# Patient Record
Sex: FEMALE | Race: WHITE | NOT HISPANIC OR LATINO | Employment: UNEMPLOYED | URBAN - NONMETROPOLITAN AREA
[De-identification: names, ages, dates, MRNs, and addresses within clinical notes are randomized per-mention and may not be internally consistent; named-entity substitution may affect disease eponyms.]

---

## 2020-08-28 PROBLEM — R10.11 RIGHT UPPER QUADRANT PAIN: Status: ACTIVE | Noted: 2020-08-28

## 2020-08-30 PROBLEM — K81.9 ACALCULOUS CHOLECYSTITIS: Status: ACTIVE | Noted: 2020-08-30

## 2020-08-30 PROBLEM — K82.8 BILIARY DYSKINESIA: Status: ACTIVE | Noted: 2020-08-30

## 2022-06-05 ENCOUNTER — HOSPITAL ENCOUNTER (EMERGENCY)
Facility: HOSPITAL | Age: 37
Discharge: HOME | End: 2022-06-05
Payer: COMMERCIAL

## 2022-06-05 VITALS
RESPIRATION RATE: 18 BRPM | DIASTOLIC BLOOD PRESSURE: 81 MMHG | HEIGHT: 65 IN | SYSTOLIC BLOOD PRESSURE: 125 MMHG | WEIGHT: 214 LBS | HEART RATE: 76 BPM | BODY MASS INDEX: 35.65 KG/M2 | OXYGEN SATURATION: 99 % | TEMPERATURE: 98 F

## 2022-06-05 DIAGNOSIS — Z91.038 ALLERGIC REACTION TO INSECT BITE: Primary | ICD-10-CM

## 2022-06-05 PROCEDURE — 96361 HYDRATE IV INFUSION ADD-ON: CPT

## 2022-06-05 PROCEDURE — 99284 EMERGENCY DEPT VISIT MOD MDM: CPT | Mod: 25

## 2022-06-05 PROCEDURE — 63600175 PHARM REV CODE 636 W HCPCS: Performed by: REGISTERED NURSE

## 2022-06-05 PROCEDURE — 99283 PR EMERGENCY DEPT VISIT,LEVEL III: ICD-10-PCS | Mod: ,,, | Performed by: REGISTERED NURSE

## 2022-06-05 PROCEDURE — 99283 EMERGENCY DEPT VISIT LOW MDM: CPT | Mod: ,,, | Performed by: REGISTERED NURSE

## 2022-06-05 PROCEDURE — 96375 TX/PRO/DX INJ NEW DRUG ADDON: CPT

## 2022-06-05 PROCEDURE — 96374 THER/PROPH/DIAG INJ IV PUSH: CPT

## 2022-06-05 PROCEDURE — 25000003 PHARM REV CODE 250: Performed by: REGISTERED NURSE

## 2022-06-05 RX ORDER — DIPHENHYDRAMINE HYDROCHLORIDE 50 MG/ML
25 INJECTION INTRAMUSCULAR; INTRAVENOUS
Status: COMPLETED | OUTPATIENT
Start: 2022-06-05 | End: 2022-06-05

## 2022-06-05 RX ORDER — METHYLPREDNISOLONE SOD SUCC 125 MG
125 VIAL (EA) INJECTION
Status: COMPLETED | OUTPATIENT
Start: 2022-06-05 | End: 2022-06-05

## 2022-06-05 RX ORDER — FAMOTIDINE 10 MG/ML
20 INJECTION INTRAVENOUS
Status: COMPLETED | OUTPATIENT
Start: 2022-06-05 | End: 2022-06-05

## 2022-06-05 RX ORDER — EPINEPHRINE 0.3 MG/.3ML
2 INJECTION SUBCUTANEOUS
Qty: 2 EACH | Refills: 0 | Status: SHIPPED | OUTPATIENT
Start: 2022-06-05

## 2022-06-05 RX ADMIN — FAMOTIDINE 20 MG: 10 INJECTION INTRAVENOUS at 07:06

## 2022-06-05 RX ADMIN — SODIUM CHLORIDE 1000 ML: 9 INJECTION, SOLUTION INTRAVENOUS at 07:06

## 2022-06-05 RX ADMIN — DIPHENHYDRAMINE HYDROCHLORIDE 25 MG: 50 INJECTION, SOLUTION INTRAMUSCULAR; INTRAVENOUS at 07:06

## 2022-06-05 RX ADMIN — METHYLPREDNISOLONE SODIUM SUCCINATE 125 MG: 125 INJECTION, POWDER, FOR SOLUTION INTRAMUSCULAR; INTRAVENOUS at 07:06

## 2022-06-06 ENCOUNTER — TELEPHONE (OUTPATIENT)
Dept: EMERGENCY MEDICINE | Facility: HOSPITAL | Age: 37
End: 2022-06-06
Payer: COMMERCIAL

## 2022-06-06 NOTE — ED PROVIDER NOTES
Encounter Date: 6/5/2022       History     Chief Complaint   Patient presents with    Insect Bite     WASP     Portia Lane is a 37 yo WF that presents with c/o allergic reaction to wasp sting.  Reports an allergy to wasp venom.  Pt is shaking excessively but able to talk.  Skin and ears are red and periorbital edema is noted bilaterally.  Pt states her lips are tingling.      The history is provided by the patient and the spouse.     Review of patient's allergies indicates:   Allergen Reactions    Wasp venom      Past Medical History:   Diagnosis Date    Arthritis     Chronic back pain     Depression     GERD (gastroesophageal reflux disease)      Past Surgical History:   Procedure Laterality Date    ADENOIDECTOMY      APPENDECTOMY      CHOLECYSTECTOMY      HYSTERECTOMY      LAPAROSCOPIC CHOLECYSTECTOMY N/A 08/29/2020    Procedure: CHOLECYSTECTOMY, LAPAROSCOPIC;  Surgeon: Wagner Schroeder MD;  Location: [facility];  Service: General;  Laterality: N/A;    TONSILLECTOMY      TUBAL LIGATION       History reviewed. No pertinent family history.  Social History     Tobacco Use    Smoking status: Current Every Day Smoker    Smokeless tobacco: Never Used   Substance Use Topics    Alcohol use: Yes     Comment: OCCAISIONAL    Drug use: No     Review of Systems   Constitutional: Positive for chills.   HENT: Positive for facial swelling (periorbital). Negative for trouble swallowing and voice change.    Eyes: Positive for redness.   Respiratory: Positive for cough and shortness of breath. Negative for wheezing and stridor.    Gastrointestinal: Positive for abdominal pain.   Endocrine: Negative.    Genitourinary: Negative.    Musculoskeletal: Negative.    Skin: Positive for color change (erythematous).   Neurological: Positive for tremors and light-headedness.   Psychiatric/Behavioral: The patient is nervous/anxious.        Physical Exam     Initial Vitals [06/05/22 1942]   BP Pulse Resp Temp SpO2   108/82 107  (!) 24 97.7 °F (36.5 °C) 97 %      MAP       --         Physical Exam    Constitutional: She appears well-developed and well-nourished. She is not diaphoretic. She appears distressed (anxious).   HENT:   Head: Normocephalic and atraumatic.   Right Ear: External ear normal.   Left Ear: External ear normal.   Nose: Nose normal.   Mouth/Throat: Oropharynx is clear and moist. No oropharyngeal exudate.   Eyes: Conjunctivae and EOM are normal. Pupils are equal, round, and reactive to light.   Neck: Neck supple. No tracheal deviation present.   Normal range of motion.  Cardiovascular: Regular rhythm and normal heart sounds.   Pulmonary/Chest: Breath sounds normal. No stridor. No respiratory distress. She has no wheezes.   Abdominal: Abdomen is soft. Bowel sounds are normal.   Generalized abd pain that feels better with legs bent         Musculoskeletal:         General: Normal range of motion.      Cervical back: Normal range of motion and neck supple.     Lymphadenopathy:     She has no cervical adenopathy.   Neurological: She is alert and oriented to person, place, and time. She has normal strength. GCS score is 15. GCS eye subscore is 4. GCS verbal subscore is 5. GCS motor subscore is 6.   Skin: Skin is warm and dry. Capillary refill takes less than 2 seconds. No rash noted. There is erythema.   Psychiatric: Judgment and thought content normal.         Medical Screening Exam   See Full Note    ED Course   Procedures  Labs Reviewed - No data to display       Imaging Results    None          Medications   methylPREDNISolone sodium succinate injection 125 mg (125 mg Intravenous Given 6/5/22 1935)   famotidine (PF) injection 20 mg (20 mg Intravenous Given 6/5/22 1935)   diphenhydrAMINE injection 25 mg (25 mg Intravenous Given 6/5/22 1934)   sodium chloride 0.9% bolus 1,000 mL (0 mLs Intravenous Stopped 6/5/22 2050)     Medical Decision Making:   ED Management:  -Pt is much improved   -Denies any complaint                    Clinical Impression:   Final diagnoses:  [Z91.038] Allergic reaction to insect bite (Primary)          ED Disposition Condition    Discharge Stable        ED Prescriptions     Medication Sig Dispense Start Date End Date Auth. Provider    EPINEPHrine (EPIPEN) 0.3 mg/0.3 mL AtIn Inject 0.6 mLs (0.6 mg total) into the muscle as needed (After wasp sting). 2 each 6/5/2022  DARLENE Garcia        Follow-up Information     Follow up With Specialties Details Why Contact Info    [facility]  In 3 days  [address] [place] [id number] [phone]             DARLENE Garcia  06/05/22 2105       DARLENE Garcia  06/05/22 2107

## 2022-06-06 NOTE — ED NOTES
"ASSISTED PT TO RESTROOM AND BACK TO STRETCHER, STATES THAT SHE "FEELS SO MUCH BETTER" THE RESNESS ON HER EARS, CHEST AND ARMS HAS DISAPPATED AND THE PERIORBITAL EDEMA IS GONE.  "

## 2022-06-06 NOTE — ED TRIAGE NOTES
35 YO FE TO ER AFTER BEING STUNG ON ABD BY A WASP, PT IS ALLERGIC TO WASP VENOM.  PT HAS A EPI PEN, BUT WAS AFRAID TO USE IT.  PT IS VERY ANXIOUS AND SHAKING.  RESP REG AND UNLABORED, BUT PT SAYS SHE CAN FEEL HER FACE STARTING TO SWELL.  ICE PACK APPLIED TO ABD.

## 2022-06-07 ENCOUNTER — TELEPHONE (OUTPATIENT)
Dept: EMERGENCY MEDICINE | Facility: HOSPITAL | Age: 37
End: 2022-06-07
Payer: COMMERCIAL

## 2022-06-08 ENCOUNTER — TELEPHONE (OUTPATIENT)
Dept: EMERGENCY MEDICINE | Facility: HOSPITAL | Age: 37
End: 2022-06-08
Payer: COMMERCIAL